# Patient Record
Sex: MALE | Race: WHITE | NOT HISPANIC OR LATINO | Employment: UNEMPLOYED | URBAN - METROPOLITAN AREA
[De-identification: names, ages, dates, MRNs, and addresses within clinical notes are randomized per-mention and may not be internally consistent; named-entity substitution may affect disease eponyms.]

---
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
## 2023-01-05 ENCOUNTER — APPOINTMENT (EMERGENCY)
Dept: RADIOLOGY | Facility: HOSPITAL | Age: 23
End: 2023-01-05

## 2023-01-05 ENCOUNTER — HOSPITAL ENCOUNTER (EMERGENCY)
Facility: HOSPITAL | Age: 23
Discharge: HOME | End: 2023-01-05
Attending: EMERGENCY MEDICINE

## 2023-01-05 VITALS
BODY MASS INDEX: 25.46 KG/M2 | OXYGEN SATURATION: 98 % | SYSTOLIC BLOOD PRESSURE: 132 MMHG | DIASTOLIC BLOOD PRESSURE: 84 MMHG | TEMPERATURE: 97.8 F | HEART RATE: 89 BPM | WEIGHT: 172.4 LBS | RESPIRATION RATE: 18 BRPM

## 2023-01-05 DIAGNOSIS — M25.561 RIGHT KNEE PAIN: Primary | ICD-10-CM

## 2023-01-05 NOTE — ED PROVIDER NOTES
History  Chief Complaint   Patient presents with   • Knee Pain     Pt c/o right knee pain cant put a lot of weight of pressure on it for about a week and a half  Denies injury      29-year-old male presents with right-sided knee pain that has been present for a few months but has been worsening over the past few days  He denies any trauma or injury to the knee  States that the pain seems to be worse in the morning and then it will have some relief throughout the day but then gets worse the more he walks on it  He describes as a pulling on the medial aspect of the right side of the knee  Also reports some clicking when walking  He denies any swelling or redness  No numbness or tingling in his leg  No weakness  Does wear a knee brace as needed  Has not tried Motrin or Tylenol  States that he did have knee issues as a child and was told it was "growing pains"  Prior to Admission Medications   Prescriptions Last Dose Informant Patient Reported? Taking? al mag oxide-diphenhydramine-lidocaine viscous (MAGIC MOUTHWASH) 1:1:1 suspension   No No   Sig: Swish and spit 10 mL every 4 (four) hours as needed for mouth pain or discomfort   benzonatate (TESSALON PERLES) 100 mg capsule   No No   Sig: Take 1 capsule (100 mg total) by mouth 3 (three) times a day as needed for cough   cyclobenzaprine (FLEXERIL) 10 mg tablet   No No   Sig: Take 1 tablet (10 mg total) by mouth daily at bedtime for 10 days   fluticasone (FLONASE) 50 mcg/act nasal spray   Yes No   Si sprays into each nostril      Facility-Administered Medications: None       Past Medical History:   Diagnosis Date   • Allergic        History reviewed  No pertinent surgical history  History reviewed  No pertinent family history  I have reviewed and agree with the history as documented      E-Cigarette/Vaping     E-Cigarette/Vaping Substances     Social History     Tobacco Use   • Smoking status: Never   • Smokeless tobacco: Never   Substance Use Topics   • Alcohol use: No   • Drug use: No       Review of Systems   Musculoskeletal: Positive for arthralgias  All other systems reviewed and are negative  Physical Exam  Physical Exam  Vitals and nursing note reviewed  Constitutional:       General: He is not in acute distress  Appearance: Normal appearance  He is well-developed  He is not ill-appearing  HENT:      Head: Normocephalic and atraumatic  Eyes:      Conjunctiva/sclera: Conjunctivae normal    Cardiovascular:      Rate and Rhythm: Normal rate  Pulmonary:      Effort: Pulmonary effort is normal    Musculoskeletal:         General: Tenderness present  No swelling  Normal range of motion  Cervical back: Normal range of motion and neck supple  Legs:       Comments: Equal strength b/l  Has pain with bending of knee  Full ROM  Sensation intact  No redness, swelling or warmth over knee  Skin:     General: Skin is warm and dry  Capillary Refill: Capillary refill takes less than 2 seconds  Neurological:      Mental Status: He is alert  Psychiatric:         Mood and Affect: Mood normal          Vital Signs  ED Triage Vitals [01/05/23 1148]   Temperature Pulse Respirations Blood Pressure SpO2   97 8 °F (36 6 °C) 89 18 132/84 98 %      Temp src Heart Rate Source Patient Position - Orthostatic VS BP Location FiO2 (%)   -- -- Sitting Right arm --      Pain Score       --           Vitals:    01/05/23 1148   BP: 132/84   Pulse: 89   Patient Position - Orthostatic VS: Sitting         Visual Acuity      ED Medications  Medications - No data to display    Diagnostic Studies  Results Reviewed     None                 XR knee 4+ views Right injury   ED Interpretation by Jalil Hoang PA-C (01/05 1327)   No acute osseous abnormalities                  Procedures  Procedures         ED Course         Medical Decision Making  57-year-old male presents with right knee pain  Physical exam as noted above    No warmth, redness or swelling of the knee  Xray reviewed by me and it showed no acute osseous abnormalities  Discussed that it is likely a ligamental injury  Referral placed to ortho  Supportive measures discussed with pt and father  I have discussed the plan to discharge pt from ED  The patient was discharged in stable condition   Patient ambulated off the department   Extensive return to emergency department precautions were discussed   Follow up with appropriate providers including primary care physician was discussed   Patient and/or their  primary decision maker expressed understanding  Bharathi Martin remained stable during entire emergency department stay  Portions of the record may have been created with voice recognition software  Occasional wrong word or "sound a like" substitutions may have occurred due to the inherent limitations of voice recognition software  Read the chart carefully and recognize, using context, where substitutions have occurred  Right knee pain: acute illness or injury  Amount and/or Complexity of Data Reviewed  Independent Historian: parent  Radiology: ordered and independent interpretation performed  Decision-making details documented in ED Course  Risk  OTC drugs  Disposition  Final diagnoses:   Right knee pain     Time reflects when diagnosis was documented in both MDM as applicable and the Disposition within this note     Time User Action Codes Description Comment    1/5/2023  1:21 PM Omer Crooked Add [U78 [id number]] Right knee pain       ED Disposition     ED Disposition   Discharge    Condition   Stable    Date/Time   Thu Jan 5, 2023  1:21 PM    [address] [facility] discharge to home/self care                 Follow-up Information     Follow up With Specialties Details Why Contact Info Additional [phone] [facility] Schedule an appointment as soon as possible for a visit    Asher Sous[place] [phone]  [address] [place] [address], [address] [place] [address], [address] [place] [address] [place], [place], [address] [phone]          Discharge Medication List as of 1/5/2023  1:27 PM      CONTINUE these medications which have NOT CHANGED    Details   al mag oxide-diphenhydramine-lidocaine viscous (MAGIC MOUTHWASH) 1:1:1 suspension Swish and spit 10 mL every 4 (four) hours as needed for mouth pain or discomfort, Starting Wed 11/20/2019, Normal      benzonatate (TESSALON PERLES) 100 mg capsule Take 1 capsule (100 mg total) by mouth 3 (three) times a day as needed for cough, Starting Thu 2/20/2020, Normal      cyclobenzaprine (FLEXERIL) 10 mg tablet Take 1 tablet (10 mg total) by mouth daily at bedtime for 10 days, Starting Wed 1/23/2019, Until Sat 2/2/2019, Normal      fluticasone (FLONASE) 50 mcg/act nasal spray 2 sprays into each nostril, Historical Med                 PDMP Review     None          ED Provider  Electronically Signed by           Dawood Hutchinson PA-C  01/05/23 2735

## 2024-12-05 ENCOUNTER — OFFICE VISIT (OUTPATIENT)
Dept: URGENT CARE | Age: 24
End: 2024-12-05
Payer: COMMERCIAL

## 2024-12-05 VITALS
HEART RATE: 75 BPM | BODY MASS INDEX: 25.99 KG/M2 | DIASTOLIC BLOOD PRESSURE: 76 MMHG | WEIGHT: 176 LBS | RESPIRATION RATE: 18 BRPM | OXYGEN SATURATION: 99 % | TEMPERATURE: 97.8 F | SYSTOLIC BLOOD PRESSURE: 118 MMHG

## 2024-12-05 DIAGNOSIS — H66.92 ACUTE LEFT OTITIS MEDIA: Primary | ICD-10-CM

## 2024-12-05 DIAGNOSIS — H60.502 ACUTE OTITIS EXTERNA OF LEFT EAR, UNSPECIFIED TYPE: ICD-10-CM

## 2024-12-05 PROCEDURE — 99213 OFFICE O/P EST LOW 20 MIN: CPT

## 2024-12-05 RX ORDER — OFLOXACIN 3 MG/ML
10 SOLUTION AURICULAR (OTIC) DAILY
Qty: 3.5 ML | Refills: 0 | Status: SHIPPED | OUTPATIENT
Start: 2024-12-05 | End: 2024-12-12

## 2024-12-05 NOTE — PROGRESS NOTES
[facility]        NAME: German Genao is a 24 y.o. male  : 2000    MRN: [id number]  DATE: 2024  TIME: 12:35 PM      Assessment and Plan     Acute left otitis media [H66.92]  1. Acute left otitis media  amoxicillin-clavulanate (AUGMENTIN) 875-125 mg per tablet      2. Acute otitis externa of left ear, unspecified type  ofloxacin (FLOXIN) 0.3 % otic solution        Patient agreeable to use Debrox drops at home for earwax in right ear and to flush her ear at home as needed with the left ear.    Patient Instructions     Take antibiotic as prescribed. Recommend probiotic use while taking antibiotic. Use antibiotic eardrops as directed.  Recommend to avoid swimming or submerging your head underwater until completed with antibiotics.  Recommend ear plugs in the future with swimming.   Continue use of debrox drops in right ear at home; can flush at home like you did with the left ear.   Acetaminophen or ibuprofen over-the-counter for pain.  PCP follow-up in 3 to 5 days.  Proceed to the emergency department symptoms    Chief Complaint     Chief Complaint   Patient presents with    Earache     Left ear  Started about two days ago  States has been using debrox drops without relief          History of Present Illness     Patient is a 24-year-old male who presents with sensation of fullness left ear.  States that he did use Debrox drops at home and flushed and got a large amount of wax out.  States he feels like an air pod is stuck in the ear.  Denies fever.  Denies pain.    Earache   Associated symptoms include hearing loss. Pertinent negatives include no ear discharge.       Review of Systems     Review of Systems   Constitutional:  Negative for fever.   HENT:  Positive for ear pain and hearing loss. Negative for ear discharge.    All other systems reviewed and are negative.        Current Medications       Current Outpatient Medications:     amoxicillin-clavulanate (AUGMENTIN) 875-125 mg  per tablet, Take 1 tablet by mouth every 12 (twelve) hours for 7 days, Disp: 14 tablet, Rfl: 0    fluticasone (FLONASE) 50 mcg/act nasal spray, 2 sprays into each nostril, Disp: , Rfl:     ofloxacin (FLOXIN) 0.3 % otic solution, Administer 10 drops into the left ear daily for 7 days, Disp: 3.5 mL, Rfl: 0    al mag oxide-diphenhydramine-lidocaine viscous (MAGIC MOUTHWASH) 1:1:1 suspension, Swish and spit 10 mL every 4 (four) hours as needed for mouth pain or discomfort (Patient not taking: Reported on 12/5/2024), Disp: 90 mL, Rfl: 0    benzonatate (TESSALON PERLES) 100 mg capsule, Take 1 capsule (100 mg total) by mouth 3 (three) times a day as needed for cough (Patient not taking: Reported on 12/5/2024), Disp: 20 capsule, Rfl: 0    cyclobenzaprine (FLEXERIL) 10 mg tablet, Take 1 tablet (10 mg total) by mouth daily at bedtime for 10 days, Disp: 30 tablet, Rfl: 0    Current Allergies     Allergies as of 12/05/2024 - Reviewed 12/05/2024   Allergen Reaction Noted    Pollen extract Allergic Rhinitis 06/17/2020              The following portions of the patient's history were reviewed and updated as appropriate: allergies, current medications, past family history, past medical history, past social history, past surgical history and problem list.     Past Medical History:   Diagnosis Date    Allergic        No past surgical history on file.    No family history on file.      Medications have been verified.        Objective     /76   Pulse 75   Temp 97.8 °F (36.6 °C)   Resp 18   Wt 79.8 kg (176 lb)   SpO2 99%   BMI 25.99 kg/m²   No LMP for male patient.         Physical Exam     Physical Exam  Vitals and nursing note reviewed.   Constitutional:       General: He is not in acute distress.     Appearance: Normal appearance. He is not ill-appearing, toxic-appearing or diaphoretic.   HENT:      Right Ear: Tympanic membrane is not injected, erythematous or bulging.      Left Ear: Swelling present. No drainage.  Tympanic membrane is injected, erythematous and bulging.      Ears:      Comments: Moderate amount of earwax noted to right ear canal.  No impaction.  No pain to the right ear.  Cardiovascular:      Rate and Rhythm: Normal rate.      Pulses: Normal pulses.      Heart sounds: Normal heart sounds, S1 normal and S2 normal.   Pulmonary:      Effort: Pulmonary effort is normal.      Breath sounds: Normal breath sounds and air entry. No stridor, decreased air movement or transmitted upper airway sounds. No decreased breath sounds, wheezing, rhonchi or rales.   Skin:     General: Skin is warm.      Capillary Refill: Capillary refill takes less than 2 seconds.   Neurological:      General: No focal deficit present.      Mental Status: He is alert.   Psychiatric:         Mood and Affect: Mood normal.         Behavior: Behavior normal.         Thought Content: Thought content normal.         Judgment: Judgment normal.

## 2024-12-05 NOTE — PATIENT INSTRUCTIONS
Take antibiotic as prescribed. Recommend probiotic use while taking antibiotic. Use antibiotic eardrops as directed.  Recommend to avoid swimming or submerging your head underwater until completed with antibiotics.  Recommend ear plugs in the future with swimming.   Continue use of debrox drops in right ear at home; can flush at home like you did with the left ear.   Acetaminophen or ibuprofen over-the-counter for pain.  PCP follow-up in 3 to 5 days.  Proceed to the emergency department symptoms

## 2025-01-07 ENCOUNTER — HOSPITAL ENCOUNTER (EMERGENCY)
Facility: HOSPITAL | Age: 25
Discharge: HOME | End: 2025-01-07
Attending: EMERGENCY MEDICINE
Payer: COMMERCIAL

## 2025-01-07 ENCOUNTER — APPOINTMENT (EMERGENCY)
Dept: RADIOLOGY | Facility: HOSPITAL | Age: 25
End: 2025-01-07
Payer: COMMERCIAL

## 2025-01-07 VITALS
TEMPERATURE: 97.8 F | RESPIRATION RATE: 18 BRPM | BODY MASS INDEX: 26.7 KG/M2 | SYSTOLIC BLOOD PRESSURE: 154 MMHG | OXYGEN SATURATION: 97 % | DIASTOLIC BLOOD PRESSURE: 89 MMHG | HEART RATE: 96 BPM | WEIGHT: 180.78 LBS

## 2025-01-07 DIAGNOSIS — M22.2X9 PATELLA-FEMORAL SYNDROME: Primary | ICD-10-CM

## 2025-01-07 PROCEDURE — 73564 X-RAY EXAM KNEE 4 OR MORE: CPT

## 2025-01-07 PROCEDURE — 99283 EMERGENCY DEPT VISIT LOW MDM: CPT

## 2025-01-07 PROCEDURE — 99284 EMERGENCY DEPT VISIT MOD MDM: CPT

## 2025-01-07 RX ORDER — NAPROXEN 500 MG/1
500 TABLET ORAL 2 TIMES DAILY WITH MEALS
Qty: 30 TABLET | Refills: 0 | Status: SHIPPED | OUTPATIENT
Start: 2025-01-07

## 2025-01-07 NOTE — DISCHARGE INSTRUCTIONS
Patient advised to follow-up with orthopedics.  Advised to continue supportive treatment advised to rest the knee for 2 weeks.  You can use ice after activity.  Any worsening symptoms you should return to the emergency department.    Patient was advised to return to the ED with any worsening symptoms that were explained on discharge including but not limited to chest pain, shortness of breath, irretractable vomiting or diarrhea, vision loss, loss of function, loss of sensation, syncope, hemoptysis, hematochezia, hematemesis, melena, decreased oral intake, feeling ill.

## 2025-01-07 NOTE — ED PROVIDER NOTES
Time reflects when diagnosis was documented in both MDM as applicable and the Disposition within this note       Time User Action Codes Description Comment    1/7/2025  1:25 PM Kirby Sylvester Add [M22.2X9] Patella-femoral syndrome           ED Disposition       ED Disposition   Discharge    Condition   Stable    Date/Time   Tue Jan 7, 2025  1:25 PM    Comment   German Genao discharge to home/self care.                   Assessment & Plan       Medical Decision Making  24-year-old male presented ED with a chief complaint of left knee pain.  Also endorsing intermittent left knee pain.  Cardiopulmonary exam is benign.  On examination of the left knee no overlying skin changes.  No erythema edema or ecchymosis noted.  The joint is stable with negative Lachman and posterior drawer test.  Neurovascular intact distally.  Able to ambulate in ED.  No difficulties with bearing weight.  Pain is over the suprapatellar region endorses that a tight pain and has pain walking up stairs.  The exam is consistent with patellofemoral syndrome.  X-ray showing no acute osseous abnormalities.  Patient given ambulatory referral to orthopedics.  Advised patient on supportive treatment at home.  Patient given strict return to ED protocol with any worsening symptoms explained on discharge.  Knee brace was given for support to the left knee.    Patient understood diagnosis and treatment plan and had no further questions.  Patient was discharged in stable condition.  Patient was advised to follow-up with her PCP in 1 to 2 days.  Patient was advised to return to the ED with any worsening symptoms that were explained on discharge including but not limited to chest pain, shortness of breath, irretractable vomiting or diarrhea, vision loss, loss of function, loss of sensation, syncope, hemoptysis, hematochezia, hematemesis, melena, decreased oral intake, feeling ill.     Ddx-patellofemoral, Osgood-Schlatter's, knee sprain, ligament  "injury, muscle strain, patellar tendinitis    Portions of the record may have been created with voice recognition software. Occasional wrong word or \"sound a like\" substitutions may have occurred due to the inherent limitations of voice recognition software. Read the chart carefully and recognize, using context, where substitutions have occurred.      Amount and/or Complexity of Data Reviewed  Radiology: ordered and independent interpretation performed.     Details: See MDM    Risk  Prescription drug management.  Risk Details: Risk of worsening symptoms along with signs and symptoms worsening symptoms were thoroughly explained on discharge.  Risk of incomplete follow-up was discussed.  Patient had full understanding of all risks had no further questions and was discharged in stable condition.              Medications - No data to display    ED Risk Strat Scores                                              History of Present Illness       Chief Complaint   Patient presents with    Knee Pain     B/l knee pain, however reports left knee is more painful. Denies trauma/injury. Sx x1 week. Taking motrin without relief.        Past Medical History:   Diagnosis Date    Allergic       History reviewed. No pertinent surgical history.   History reviewed. No pertinent family history.   Social History     Tobacco Use    Smoking status: Never    Smokeless tobacco: Never   Substance Use Topics    Alcohol use: No    Drug use: No      E-Cigarette/Vaping      E-Cigarette/Vaping Substances      I have reviewed and agree with the history as documented.     24-year-old male presenting the ED with a chief complaint of left knee pain.  Patient stating that it is progressively worsening over the past week.  Stated that he has not had any injuries.  Stated that he did squat the other day and he felt like it got worse after squatting.  He locates the pain to the suprapatellar region.  Describes it as a tight sensation when bending or trying " to extend the leg.  He does state that the pain is worse when walking upstairs.  Also endorses intermittent right knee pain.  Denies any chills fevers diaphoresis.  Denies nausea vomiting diarrhea.Patient denies any chest pain, shortness of breath, vomiting, diarrhea, chills, diaphoresis, fevers, loss of consciousness, syncope, urinary and bowel changes, abdominal pain, visual symptoms, vision loss, loss of function, loss of sensation, decreased oral intake, hemoptysis, hematochezia, hematemesis, melena, confusion.         Review of Systems   Constitutional:  Negative for activity change, appetite change, chills, diaphoresis, fatigue and fever.   HENT:  Negative for congestion, ear discharge, ear pain, postnasal drip, rhinorrhea, sinus pressure, sinus pain and sore throat.    Eyes:  Negative for photophobia and visual disturbance.   Respiratory:  Negative for cough, chest tightness and shortness of breath.    Cardiovascular:  Negative for chest pain and palpitations.   Gastrointestinal:  Negative for abdominal distention, abdominal pain, constipation, diarrhea, nausea and vomiting.   Genitourinary:  Negative for difficulty urinating, dysuria, flank pain, frequency and hematuria.   Musculoskeletal:  Positive for arthralgias. Negative for back pain, joint swelling, myalgias, neck pain and neck stiffness.   Skin:  Negative for rash and wound.   Neurological:  Negative for dizziness, tremors, syncope, facial asymmetry, weakness, light-headedness, numbness and headaches.           Objective       ED Triage Vitals [01/07/25 1218]   Temperature Pulse Blood Pressure Respirations SpO2 Patient Position - Orthostatic VS   97.8 °F (36.6 °C) 96 154/89 18 97 % --      Temp src Heart Rate Source BP Location FiO2 (%) Pain Score    -- -- -- -- --      Vitals      Date and Time Temp Pulse SpO2 Resp BP Pain Score FACES Pain Rating User   01/07/25 1218 97.8 °F (36.6 °C) 96 97 % 18 154/89 -- -- RAND            Physical Exam  Vitals and  nursing note reviewed.   Constitutional:       General: He is not in acute distress.     Appearance: Normal appearance. He is normal weight. He is not ill-appearing, toxic-appearing or diaphoretic.   HENT:      Head: Normocephalic.      Right Ear: External ear normal.      Left Ear: External ear normal.      Nose: Nose normal.      Mouth/Throat:      Mouth: Mucous membranes are moist.   Eyes:      Conjunctiva/sclera: Conjunctivae normal.   Cardiovascular:      Rate and Rhythm: Normal rate and regular rhythm.      Pulses: Normal pulses.   Pulmonary:      Effort: Pulmonary effort is normal. No respiratory distress.      Breath sounds: Normal breath sounds. No stridor. No wheezing, rhonchi or rales.   Chest:      Chest wall: No tenderness.   Abdominal:      General: Bowel sounds are normal.      Palpations: Abdomen is soft.      Tenderness: There is no abdominal tenderness.   Musculoskeletal:         General: Tenderness present. No swelling, deformity or signs of injury. Normal range of motion.      Cervical back: Normal range of motion.      Comments: Tenderness to the suprapatellar region of the left knee.  No decreases in range of motion.  Sensation intact distally.  Pulses distally are intact.  No overlying skin changes.  No erythema edema or ecchymosis noted.  Will x-ray to further evaluate.  Likely a patellofemoral syndrome    Skin:     General: Skin is warm and dry.      Findings: No rash.   Neurological:      General: No focal deficit present.      Mental Status: He is alert and oriented to person, place, and time.   Psychiatric:         Mood and Affect: Mood normal.         Results Reviewed       None            XR knee 4+ views left injury   Final Interpretation by Gigi Thompson MD (01/07 3427)      No acute osseous abnormality.         Computerized Assisted Algorithm (CAA) may have been used to analyze all applicable images.         Workstation performed: [id number]             Procedures    ED Medication and  Procedure Management   Prior to Admission Medications   Prescriptions Last Dose Informant Patient Reported? Taking?   al mag oxide-diphenhydramine-lidocaine viscous (MAGIC MOUTHWASH) 1:1:1 suspension   No No   Sig: Swish and spit 10 mL every 4 (four) hours as needed for mouth pain or discomfort   Patient not taking: Reported on 2024   benzonatate (TESSALON PERLES) 100 mg capsule   No No   Sig: Take 1 capsule (100 mg total) by mouth 3 (three) times a day as needed for cough   Patient not taking: Reported on 2024   cyclobenzaprine (FLEXERIL) 10 mg tablet   No No   Sig: Take 1 tablet (10 mg total) by mouth daily at bedtime for 10 days   fluticasone (FLONASE) 50 mcg/act nasal spray   Yes No   Si sprays into each nostril      Facility-Administered Medications: None     Discharge Medication List as of 2025  1:29 PM        START taking these medications    Details   naproxen (Naprosyn) 500 mg tablet Take 1 tablet (500 mg total) by mouth 2 (two) times a day with meals, Starting 2025, Normal           CONTINUE these medications which have NOT CHANGED    Details   al mag oxide-diphenhydramine-lidocaine viscous (MAGIC MOUTHWASH) 1:1:1 suspension Swish and spit 10 mL every 4 (four) hours as needed for mouth pain or discomfort, Starting 2019, Normal      benzonatate (TESSALON PERLES) 100 mg capsule Take 1 capsule (100 mg total) by mouth 3 (three) times a day as needed for cough, Starting u 2020, Normal      cyclobenzaprine (FLEXERIL) 10 mg tablet Take 1 tablet (10 mg total) by mouth daily at bedtime for 10 days, Starting 2019, Until Sat 2019, Normal      fluticasone (FLONASE) 50 mcg/act nasal spray 2 sprays into each nostril, Historical [facility]             ED SEPSIS DOCUMENTATION   Time reflects when diagnosis was documented in both MDM as applicable and the Disposition within this note       Time User Action Codes Description Comment    2025  1:25 PM Kirby Sylvester Add  [M22.2X9] Patella-femoral syndrome                  Kirby Sylvester PA-C  01/07/25 1626